# Patient Record
Sex: FEMALE | Race: WHITE | NOT HISPANIC OR LATINO | URBAN - METROPOLITAN AREA
[De-identification: names, ages, dates, MRNs, and addresses within clinical notes are randomized per-mention and may not be internally consistent; named-entity substitution may affect disease eponyms.]

---

## 2019-01-08 ENCOUNTER — APPOINTMENT (OUTPATIENT)
Age: 34
Setting detail: DERMATOLOGY
End: 2019-01-13

## 2019-01-08 DIAGNOSIS — L90.5 SCAR CONDITIONS AND FIBROSIS OF SKIN: ICD-10-CM

## 2019-01-08 PROCEDURE — OTHER MIPS QUALITY: OTHER

## 2019-01-08 PROCEDURE — OTHER DEFER: OTHER

## 2019-01-08 PROCEDURE — OTHER COSMETIC QUOTE: OTHER

## 2019-01-08 PROCEDURE — OTHER COUNSELING - SCAR (COSMETIC): OTHER

## 2019-01-08 ASSESSMENT — LOCATION DETAILED DESCRIPTION DERM: LOCATION DETAILED: LEFT ANTERIOR PROXIMAL UPPER ARM

## 2019-01-08 ASSESSMENT — LOCATION ZONE DERM: LOCATION ZONE: ARM

## 2019-01-08 ASSESSMENT — LOCATION SIMPLE DESCRIPTION DERM: LOCATION SIMPLE: LEFT UPPER ARM

## 2019-01-08 NOTE — PROCEDURE: DEFER
Instructions (Optional): Measures 3.7 x 1 cm, advised patient of treatment options scar revision vs Kenalog injections
Procedure To Be Performed At Next Visit: -- Select One
Detail Level: Detailed

## 2019-01-08 NOTE — PROCEDURE: COUNSELING - SCAR (COSMETIC)
Procedure Quote Preamble: The recommended treatment regimen includes all modalities selected and is global for the treatments during a 6 month period. Treatment is not billable to insurance. Treatment cost: $
Consultation Charge $ (Use Numbers Only, No Text Please.): 100.0
Detail Level: Detailed
Send Procedure Quote As Charge?: No

## 2019-01-08 NOTE — PROCEDURE: COSMETIC QUOTE
Misc Procedure 3 Units: 0
Anesthesia 2 Price/Unit (In Dollars- Use Only Numbers And Decimals): 0.00
Apply Facility Fee: No
Anesthesia Fee Units (Optional): 1
Quote Title (Optional- Will Act As A Header): scar revision quote
Include Sales Tax On Implants: Yes
Detail Level: Zone